# Patient Record
Sex: FEMALE | Race: BLACK OR AFRICAN AMERICAN | NOT HISPANIC OR LATINO | URBAN - METROPOLITAN AREA
[De-identification: names, ages, dates, MRNs, and addresses within clinical notes are randomized per-mention and may not be internally consistent; named-entity substitution may affect disease eponyms.]

---

## 2017-09-18 ENCOUNTER — EMERGENCY (EMERGENCY)
Facility: HOSPITAL | Age: 37
LOS: 1 days | Discharge: HOME | End: 2017-09-18
Admitting: EMERGENCY MEDICINE
Payer: COMMERCIAL

## 2017-09-18 VITALS
WEIGHT: 134.48 LBS | HEIGHT: 64 IN | RESPIRATION RATE: 16 BRPM | DIASTOLIC BLOOD PRESSURE: 90 MMHG | SYSTOLIC BLOOD PRESSURE: 132 MMHG | OXYGEN SATURATION: 100 % | HEART RATE: 72 BPM | TEMPERATURE: 98 F

## 2017-09-18 DIAGNOSIS — R53.1 WEAKNESS: ICD-10-CM

## 2017-09-18 DIAGNOSIS — J45.909 UNSPECIFIED ASTHMA, UNCOMPLICATED: ICD-10-CM

## 2017-09-18 PROCEDURE — 71020: CPT | Mod: 26

## 2017-09-18 PROCEDURE — 99284 EMERGENCY DEPT VISIT MOD MDM: CPT | Mod: 25

## 2017-09-18 PROCEDURE — 99283 EMERGENCY DEPT VISIT LOW MDM: CPT | Mod: 25

## 2017-09-18 PROCEDURE — 85025 COMPLETE CBC W/AUTO DIFF WBC: CPT

## 2017-09-18 PROCEDURE — 93010 ELECTROCARDIOGRAM REPORT: CPT

## 2017-09-18 PROCEDURE — 84702 CHORIONIC GONADOTROPIN TEST: CPT

## 2017-09-18 PROCEDURE — 71046 X-RAY EXAM CHEST 2 VIEWS: CPT

## 2017-09-18 PROCEDURE — 93005 ELECTROCARDIOGRAM TRACING: CPT

## 2017-09-18 PROCEDURE — 36415 COLL VENOUS BLD VENIPUNCTURE: CPT

## 2017-09-18 PROCEDURE — 80053 COMPREHEN METABOLIC PANEL: CPT

## 2017-09-18 NOTE — ED PROVIDER NOTE - MEDICAL DECISION MAKING DETAILS
pt feeling lightheaded and weak for apx 1 month, with associated SOB and palpitations.  no hx of syncope.  consider association to her anemia.  no cardiac risk factors; normal EKG.  PERC negative.  will check basic labs and CXR.  barring significant lab abnormalities, will be able to follow up with her  PCP as an outpatient.

## 2017-09-18 NOTE — ED ADULT TRIAGE NOTE - CHIEF COMPLAINT QUOTE
" no feeling well for one months, feeling dizzy, I might have hx of anemia" denies any other PMH " no feeling well for one months, feeling dizzy, I might have hx of anemia" denies any other PMH  denies chest pain. no SOB noted

## 2017-09-18 NOTE — ED PROVIDER NOTE - OBJECTIVE STATEMENT
pt with reported history of unclear type of anemia and asthma only in childhood c/o feeling "weak and dizzy" for about a month.  the symptoms are intermittent, but were more severe today than in the past as she walked home from work.  she felt short of breath and palpitations even after resting when she got home.  there was no chest pain, and she has never fainted.  she does not have a headache, but she had one two days ago.    she denies hx of DVT/PE, malignancy, hemoptysis, recent immobilization, estrogen/OCP use, pt with reported history of unclear type of anemia and asthma only in childhood c/o feeling "weak and dizzy" for about a month.  the symptoms are intermittent, but were more severe today than in the past as she walked home from work.  she felt short of breath and palpitations even after resting when she got home.  she took one of her sister's ferrous sulfate pills, but didn't feel any better. she feels generally weak.  there was no chest pain, and she has never fainted.  she does not have a headache, but she had one two days ago.    she denies hx of DVT/PE, malignancy, hemoptysis, recent immobilization, estrogen/OCP use, or unilateral leg pain/swelling.  no personal hx of HTN, HLD, heart disease, DM, obesity, smoking.  no family hx of premature heart disease or sudden death.  she denies any tobacco or cocaine/other drug use.  her PCP is Dr Branch. pt with reported history of unclear type of anemia and asthma only in childhood c/o feeling "weak and dizzy" for about a month.  she has not spoken with her physician about this.   the symptoms are intermittent, but were more severe today than in the past as she walked home from work.  she felt short of breath and palpitations even after resting when she got home.  she took one of her sister's ferrous sulfate pills, but didn't feel any better. she feels generally weak.  there was no chest pain, and she has never fainted.  she does not have a headache, but she had one two days ago.    she denies hx of DVT/PE, malignancy, hemoptysis, recent immobilization, estrogen/OCP use, or unilateral leg pain/swelling.  no personal hx of HTN, HLD, heart disease, DM, obesity, smoking.  no family hx of premature heart disease or sudden death.  she denies any tobacco or cocaine/other drug use.  her PCP is Dr Branch.

## 2017-09-18 NOTE — ED PROVIDER NOTE - GENITOURINARY [-], MLM
no hematuria/no pelvic pain/no difficulty urinating/no vaginal bleeding; periods usually last 4 days, without heavy bleeding/no dysmenorrhea

## 2017-09-18 NOTE — ED PROVIDER NOTE - PHYSICAL EXAMINATION
CONSTITUTIONAL: WD,WN. NAD.    SKIN: Normal color and turgor. No rash.    HEAD: NC/AT.  EYES: Conjunctiva slightly pale. EOMI. PERRL.    ENT: Airway patent, OP clear. Nasal mucosa clear, no rhinorrhea.   RESPIRATORY:  Breathing non-labored. No retractions or accessory muscle use.  Lungs CTA bilat.  CARDIOVASCULAR:  RRR, S1S2. No M/R/G.      GI:  Abdomen soft, nontender.    MSK: Neck supple with painless ROM.  No extremity edema or tenderness.  No joint swelling or ROM limitation.  NEURO: Alert and oriented; PIERRE with normal strength.  Normal speech and gait.

## 2017-09-19 LAB
ALBUMIN SERPL ELPH-MCNC: 4.3 G/DL — SIGNIFICANT CHANGE UP (ref 3.3–5)
ALP SERPL-CCNC: 62 U/L — SIGNIFICANT CHANGE UP (ref 40–120)
ALT FLD-CCNC: 16 U/L — SIGNIFICANT CHANGE UP (ref 10–45)
ANION GAP SERPL CALC-SCNC: 13 MMOL/L — SIGNIFICANT CHANGE UP (ref 5–17)
AST SERPL-CCNC: 23 U/L — SIGNIFICANT CHANGE UP (ref 10–40)
BASOPHILS NFR BLD AUTO: 0.5 % — SIGNIFICANT CHANGE UP (ref 0–2)
BILIRUB SERPL-MCNC: <0.2 MG/DL — SIGNIFICANT CHANGE UP (ref 0.2–1.2)
BUN SERPL-MCNC: 8 MG/DL — SIGNIFICANT CHANGE UP (ref 7–23)
CALCIUM SERPL-MCNC: 9 MG/DL — SIGNIFICANT CHANGE UP (ref 8.4–10.5)
CHLORIDE SERPL-SCNC: 102 MMOL/L — SIGNIFICANT CHANGE UP (ref 96–108)
CO2 SERPL-SCNC: 23 MMOL/L — SIGNIFICANT CHANGE UP (ref 22–31)
CREAT SERPL-MCNC: 0.73 MG/DL — SIGNIFICANT CHANGE UP (ref 0.5–1.3)
EOSINOPHIL NFR BLD AUTO: 1.8 % — SIGNIFICANT CHANGE UP (ref 0–6)
GLUCOSE SERPL-MCNC: 116 MG/DL — HIGH (ref 70–99)
HCT VFR BLD CALC: 35.7 % — SIGNIFICANT CHANGE UP (ref 34.5–45)
HGB BLD-MCNC: 11.3 G/DL — LOW (ref 11.5–15.5)
LYMPHOCYTES # BLD AUTO: 48.3 % — HIGH (ref 13–44)
MCHC RBC-ENTMCNC: 23.3 PG — LOW (ref 27–34)
MCHC RBC-ENTMCNC: 31.7 G/DL — LOW (ref 32–36)
MCV RBC AUTO: 73.5 FL — LOW (ref 80–100)
MONOCYTES NFR BLD AUTO: 7.2 % — SIGNIFICANT CHANGE UP (ref 2–14)
NEUTROPHILS NFR BLD AUTO: 42.2 % — LOW (ref 43–77)
PLATELET # BLD AUTO: 240 K/UL — SIGNIFICANT CHANGE UP (ref 150–400)
POTASSIUM SERPL-MCNC: 3.9 MMOL/L — SIGNIFICANT CHANGE UP (ref 3.5–5.3)
POTASSIUM SERPL-SCNC: 3.9 MMOL/L — SIGNIFICANT CHANGE UP (ref 3.5–5.3)
PROT SERPL-MCNC: 7.6 G/DL — SIGNIFICANT CHANGE UP (ref 6–8.3)
RBC # BLD: 4.86 M/UL — SIGNIFICANT CHANGE UP (ref 3.8–5.2)
RBC # FLD: 14.7 % — SIGNIFICANT CHANGE UP (ref 10.3–16.9)
SODIUM SERPL-SCNC: 138 MMOL/L — SIGNIFICANT CHANGE UP (ref 135–145)
WBC # BLD: 6.1 K/UL — SIGNIFICANT CHANGE UP (ref 3.8–10.5)
WBC # FLD AUTO: 6.1 K/UL — SIGNIFICANT CHANGE UP (ref 3.8–10.5)

## 2017-09-19 NOTE — ED ADULT NURSE NOTE - OBJECTIVE STATEMENT
" no feeling well for one months, feeling dizzy, I might have hx of anemia" denies any other PMH  denies chest pain. no SOB noted

## 2019-03-05 NOTE — ED ADULT NURSE NOTE - NS ED NURSE DC INFO COMPLEXITY
It was not my intention to change the dose. If that is different than how it was done before, not sure why.  OK to change to 5 mg tablet   Simple: Patient demonstrates quick and easy understanding

## 2021-07-14 NOTE — ED ADULT NURSE NOTE - FALLEN IN THE PAST
Goal Outcome Evaluation:           Progress: no change  Outcome Summary: Pt c/o headache tylenol given for pain, denies N/V, pt states BMs more formed, cont. IVF, up ad oscar, O2 sat % on room air, states shortness of air with ambulation has improved, will cont to monitor   no